# Patient Record
Sex: MALE | Race: WHITE | NOT HISPANIC OR LATINO | Employment: FULL TIME | URBAN - METROPOLITAN AREA
[De-identification: names, ages, dates, MRNs, and addresses within clinical notes are randomized per-mention and may not be internally consistent; named-entity substitution may affect disease eponyms.]

---

## 2021-04-06 ENCOUNTER — HOSPITAL ENCOUNTER (EMERGENCY)
Age: 41
Discharge: HOME | End: 2021-04-06
Attending: STUDENT IN AN ORGANIZED HEALTH CARE EDUCATION/TRAINING PROGRAM

## 2021-04-06 ENCOUNTER — APPOINTMENT (OUTPATIENT)
Dept: GENERAL RADIOLOGY | Age: 41
End: 2021-04-06
Attending: EMERGENCY MEDICINE

## 2021-04-06 VITALS
DIASTOLIC BLOOD PRESSURE: 81 MMHG | RESPIRATION RATE: 21 BRPM | OXYGEN SATURATION: 98 % | HEART RATE: 69 BPM | SYSTOLIC BLOOD PRESSURE: 134 MMHG | TEMPERATURE: 99.1 F

## 2021-04-06 DIAGNOSIS — R07.9 CHEST PAIN, UNSPECIFIED TYPE: Primary | ICD-10-CM

## 2021-04-06 LAB
ALBUMIN SERPL-MCNC: 4.3 G/DL (ref 3.6–5.1)
ALBUMIN/GLOB SERPL: 1 {RATIO} (ref 1–2.4)
ALP SERPL-CCNC: 62 UNITS/L (ref 45–117)
ALT SERPL-CCNC: 80 UNITS/L
ANION GAP SERPL CALC-SCNC: 14 MMOL/L (ref 10–20)
AST SERPL-CCNC: 48 UNITS/L
BASOPHILS # BLD: 0.1 K/MCL (ref 0–0.3)
BASOPHILS NFR BLD: 1 %
BILIRUB SERPL-MCNC: 0.5 MG/DL (ref 0.2–1)
BUN SERPL-MCNC: 14 MG/DL (ref 6–20)
BUN/CREAT SERPL: 13 (ref 7–25)
CALCIUM SERPL-MCNC: 9.4 MG/DL (ref 8.4–10.2)
CHLORIDE SERPL-SCNC: 102 MMOL/L (ref 98–107)
CO2 SERPL-SCNC: 27 MMOL/L (ref 21–32)
CREAT SERPL-MCNC: 1.07 MG/DL (ref 0.67–1.17)
DEPRECATED RDW RBC: 44.7 FL (ref 39–50)
EOSINOPHIL # BLD: 0.1 K/MCL (ref 0–0.5)
EOSINOPHIL NFR BLD: 1 %
ERYTHROCYTE [DISTWIDTH] IN BLOOD: 13.2 % (ref 11–15)
FASTING DURATION TIME PATIENT: ABNORMAL H
GFR SERPLBLD BASED ON 1.73 SQ M-ARVRAT: 86 ML/MIN/1.73M2
GLOBULIN SER-MCNC: 4.1 G/DL (ref 2–4)
GLUCOSE SERPL-MCNC: 115 MG/DL (ref 65–99)
HCT VFR BLD CALC: 41.7 % (ref 39–51)
HGB BLD-MCNC: 14 G/DL (ref 13–17)
IMM GRANULOCYTES # BLD AUTO: 0 K/MCL (ref 0–0.2)
IMM GRANULOCYTES # BLD: 0 %
LYMPHOCYTES # BLD: 1.9 K/MCL (ref 1–4.8)
LYMPHOCYTES NFR BLD: 24 %
MCH RBC QN AUTO: 30.7 PG (ref 26–34)
MCHC RBC AUTO-ENTMCNC: 33.6 G/DL (ref 32–36.5)
MCV RBC AUTO: 91.4 FL (ref 78–100)
MONOCYTES # BLD: 0.7 K/MCL (ref 0.3–0.9)
MONOCYTES NFR BLD: 9 %
NEUTROPHILS # BLD: 5.2 K/MCL (ref 1.8–7.7)
NEUTROPHILS NFR BLD: 65 %
NRBC BLD MANUAL-RTO: 0 /100 WBC
PLATELET # BLD AUTO: 234 K/MCL (ref 140–450)
POTASSIUM SERPL-SCNC: 4.2 MMOL/L (ref 3.4–5.1)
PROT SERPL-MCNC: 8.4 G/DL (ref 6.4–8.2)
RBC # BLD: 4.56 MIL/MCL (ref 4.5–5.9)
SODIUM SERPL-SCNC: 139 MMOL/L (ref 135–145)
TROPONIN I SERPL HS-MCNC: <0.02 NG/ML
WBC # BLD: 8 K/MCL (ref 4.2–11)

## 2021-04-06 PROCEDURE — 93005 ELECTROCARDIOGRAM TRACING: CPT | Performed by: EMERGENCY MEDICINE

## 2021-04-06 PROCEDURE — 10002803 HB RX 637: Performed by: STUDENT IN AN ORGANIZED HEALTH CARE EDUCATION/TRAINING PROGRAM

## 2021-04-06 PROCEDURE — 80053 COMPREHEN METABOLIC PANEL: CPT | Performed by: EMERGENCY MEDICINE

## 2021-04-06 PROCEDURE — 99285 EMERGENCY DEPT VISIT HI MDM: CPT | Performed by: STUDENT IN AN ORGANIZED HEALTH CARE EDUCATION/TRAINING PROGRAM

## 2021-04-06 PROCEDURE — 71045 X-RAY EXAM CHEST 1 VIEW: CPT

## 2021-04-06 PROCEDURE — 85025 COMPLETE CBC W/AUTO DIFF WBC: CPT | Performed by: EMERGENCY MEDICINE

## 2021-04-06 PROCEDURE — 10002800 HB RX 250 W HCPCS: Performed by: STUDENT IN AN ORGANIZED HEALTH CARE EDUCATION/TRAINING PROGRAM

## 2021-04-06 PROCEDURE — 99285 EMERGENCY DEPT VISIT HI MDM: CPT

## 2021-04-06 PROCEDURE — 84484 ASSAY OF TROPONIN QUANT: CPT | Performed by: EMERGENCY MEDICINE

## 2021-04-06 PROCEDURE — 93010 ELECTROCARDIOGRAM REPORT: CPT | Performed by: INTERNAL MEDICINE

## 2021-04-06 PROCEDURE — 10004651 HB RX, NO CHARGE ITEM: Performed by: STUDENT IN AN ORGANIZED HEALTH CARE EDUCATION/TRAINING PROGRAM

## 2021-04-06 PROCEDURE — 96374 THER/PROPH/DIAG INJ IV PUSH: CPT

## 2021-04-06 RX ORDER — CYCLOBENZAPRINE HCL 10 MG
10 TABLET ORAL 2 TIMES DAILY PRN
Qty: 15 TABLET | Refills: 0 | Status: SHIPPED | OUTPATIENT
Start: 2021-04-06

## 2021-04-06 RX ORDER — ALBUTEROL SULFATE 90 UG/1
2 AEROSOL, METERED RESPIRATORY (INHALATION) EVERY 4 HOURS PRN
COMMUNITY

## 2021-04-06 RX ORDER — ACETAMINOPHEN 325 MG/1
650 TABLET ORAL ONCE
Status: COMPLETED | OUTPATIENT
Start: 2021-04-06 | End: 2021-04-06

## 2021-04-06 RX ORDER — DIAZEPAM 5 MG/1
5 TABLET ORAL ONCE
Status: COMPLETED | OUTPATIENT
Start: 2021-04-06 | End: 2021-04-06

## 2021-04-06 RX ORDER — ACETAMINOPHEN 325 MG/1
650 TABLET ORAL EVERY 8 HOURS PRN
Qty: 40 TABLET | Refills: 0 | Status: SHIPPED | OUTPATIENT
Start: 2021-04-06

## 2021-04-06 RX ADMIN — DIAZEPAM 5 MG: 5 TABLET ORAL at 20:35

## 2021-04-06 RX ADMIN — KETOROLAC TROMETHAMINE 15 MG: 15 INJECTION, SOLUTION INTRAMUSCULAR; INTRAVENOUS at 20:35

## 2021-04-06 RX ADMIN — ACETAMINOPHEN 650 MG: 325 TABLET, FILM COATED ORAL at 20:35

## 2021-04-06 ASSESSMENT — ENCOUNTER SYMPTOMS
COUGH: 0
LIGHT-HEADEDNESS: 0
DIARRHEA: 0
CONSTIPATION: 0
WEAKNESS: 0
DIZZINESS: 0
ABDOMINAL PAIN: 0
ABDOMINAL DISTENTION: 0
CHILLS: 0
WHEEZING: 0
FEVER: 0
SHORTNESS OF BREATH: 0

## 2021-04-06 ASSESSMENT — PAIN SCALES - GENERAL: PAINLEVEL_OUTOF10: 5

## 2021-04-07 LAB
ATRIAL RATE (BPM): 84
P AXIS (DEGREES): 72
PR-INTERVAL (MSEC): 146
QRS-INTERVAL (MSEC): 108
QT-INTERVAL (MSEC): 372
QTC: 440
R AXIS (DEGREES): 40
RAINBOW EXTRA TUBES HOLD SPECIMEN: NORMAL
REPORT TEXT: NORMAL
T AXIS (DEGREES): 38
VENTRICULAR RATE EKG/MIN (BPM): 84

## 2023-11-22 ENCOUNTER — HOSPITAL ENCOUNTER (OUTPATIENT)
Age: 43
Discharge: HOME | End: 2023-11-22
Payer: COMMERCIAL

## 2023-11-22 VITALS
WEIGHT: 204 LBS | DIASTOLIC BLOOD PRESSURE: 86 MMHG | OXYGEN SATURATION: 96 % | HEART RATE: 79 BPM | TEMPERATURE: 98 F | RESPIRATION RATE: 20 BRPM | SYSTOLIC BLOOD PRESSURE: 137 MMHG | BODY MASS INDEX: 30.92 KG/M2 | HEIGHT: 68 IN

## 2023-11-22 DIAGNOSIS — J11.1 INFLUENZA: Primary | ICD-10-CM

## 2023-11-22 LAB
POCT INFLUENZA A: NEGATIVE
POCT INFLUENZA B: POSITIVE
SARS-COV-2 RNA RESP QL NAA+PROBE: NOT DETECTED

## 2023-11-22 PROCEDURE — 87502 INFLUENZA DNA AMP PROBE: CPT | Performed by: PHYSICIAN ASSISTANT

## 2023-11-22 PROCEDURE — 99203 OFFICE O/P NEW LOW 30 MIN: CPT

## 2023-11-22 RX ORDER — PREDNISONE 20 MG/1
40 TABLET ORAL DAILY
Qty: 10 TABLET | Refills: 0 | Status: SHIPPED | OUTPATIENT
Start: 2023-11-22 | End: 2023-11-27

## 2023-11-22 RX ORDER — BENZONATATE 100 MG/1
100 CAPSULE ORAL 3 TIMES DAILY PRN
Qty: 30 CAPSULE | Refills: 0 | Status: SHIPPED | OUTPATIENT
Start: 2023-11-22 | End: 2023-12-22

## 2023-11-22 NOTE — DISCHARGE INSTRUCTIONS
Influenza can potentially last 7 to 10 days. Push clear fluids. Steroid as written. Tessalon for cough suppression. Inhaler.

## 2024-04-02 ENCOUNTER — OFFICE VISIT (OUTPATIENT)
Dept: INTERNAL MEDICINE CLINIC | Facility: CLINIC | Age: 44
End: 2024-04-02
Payer: COMMERCIAL

## 2024-04-02 VITALS
HEART RATE: 81 BPM | OXYGEN SATURATION: 99 % | WEIGHT: 206 LBS | BODY MASS INDEX: 33.11 KG/M2 | SYSTOLIC BLOOD PRESSURE: 136 MMHG | HEIGHT: 66 IN | DIASTOLIC BLOOD PRESSURE: 84 MMHG

## 2024-04-02 DIAGNOSIS — G56.21 IRRITATION OF RIGHT ULNAR NERVE: ICD-10-CM

## 2024-04-02 DIAGNOSIS — Z00.00 PREVENTATIVE HEALTH CARE: Primary | ICD-10-CM

## 2024-04-02 DIAGNOSIS — D22.9 NEVUS: ICD-10-CM

## 2024-04-02 DIAGNOSIS — K21.9 GASTROESOPHAGEAL REFLUX DISEASE, UNSPECIFIED WHETHER ESOPHAGITIS PRESENT: ICD-10-CM

## 2024-04-02 RX ORDER — CETIRIZINE HYDROCHLORIDE 10 MG/1
10 TABLET ORAL DAILY
COMMUNITY

## 2024-04-02 RX ORDER — OMEPRAZOLE 40 MG/1
40 CAPSULE, DELAYED RELEASE ORAL DAILY
Qty: 90 CAPSULE | Refills: 1 | Status: SHIPPED | OUTPATIENT
Start: 2024-04-02 | End: 2025-03-28

## 2024-04-02 RX ORDER — OMEPRAZOLE 20 MG/1
20 CAPSULE, DELAYED RELEASE ORAL
COMMUNITY
End: 2024-04-02

## 2024-04-02 NOTE — PROGRESS NOTES
Alberto Bonilla  44 year old     .    Is here for preventative wellness exam and  complaints :        Ulnar nerve  symptoms ,  GERD  sx       ROS    GENERAL:no systemic symptoms,- feels healthy  screening for depression negative, does not drink excessively, no drug use,quit cigs  3  yrs ago  social     Discussed weight/BMI=  works out  bike  wts  - eats  healthy  runs     LUNGS:denies shortness of breath or resp symptoms    CARDIOVASCULAR: denies chest pain - blimp      GI: Does not endorse any GI SYMPTOMS  other than  GERD     ALL OTHER REVIEW IS NEGATIVE       There is no problem list on file for this patient.  History reviewed. No pertinent surgical history.   Family History   Problem Relation Age of Onset    No Known Problems Father     No Known Problems Mother     Pulmonary Disease Maternal Grandmother     Diabetes Maternal Grandfather      Social History     Socioeconomic History    Marital status: Unknown   Tobacco Use    Smoking status: Former     Types: Cigarettes     Quit date: 2021     Years since quitting: 3.2    Smokeless tobacco: Never   Vaping Use    Vaping Use: Never used   Substance and Sexual Activity    Alcohol use: Yes     Comment: 7-10 weekly    Drug use: Never   Social History Narrative    ** Merged History Encounter **           Current Outpatient Medications:     cetirizine 10 MG Oral Tab, Take 1 tablet (10 mg total) by mouth daily., Disp: , Rfl:     Omeprazole 40 MG Oral Capsule Delayed Release, Take 1 capsule (40 mg total) by mouth daily., Disp: 90 capsule, Rfl: 1             EXAM:     Vitals:    04/02/24 1300   BP: 136/84   Pulse:    VITALSBody mass index is 33.25 kg/m².      Wt Readings from Last 3 Encounters:   04/02/24 206 lb (93.4 kg)   11/22/23 204 lb (92.5 kg)           BP Readings from Last 3 Encounters:   04/02/24 136/84   11/22/23 137/86           Appears Healthy    Neck: normal    Lungs: clear , nl     Heart:  Reg no m     Abdomen:  Nondistended no HSM       Back  lipoma       Extremities:  nl     Skin:  right lower  back lesion    Neurological: nl       Diagnoses and all orders for this visit:    Preventative health care  -     CBC With Differential With Platelet  -     Comp Metabolic Panel (14)  -     Lipid Panel  -     EKG 12 Lead; Future    Nevus  -     Derm Referral - [facility] ([facility])    Gastroesophageal reflux disease, unspecified whether esophagitis present    Irritation of right ulnar nerve  -     PLASTIC SURGERY - INTERNAL    Other orders  -     Omeprazole 40 MG Oral Capsule Delayed Release; Take 1 capsule (40 mg total) by mouth daily.          Discussed importance of healthy lifestyle with exercise and diet.  Recommend to avoid tobacco  Keep alcohol use to a minimum  Discussed appropriate screening tests;   Discussed vaccines    Refer to derm  -  discussed PPI and   side effects     Discussed  rechecking  bp in near future     Check labs   and EKG     Will refer to hand surgery for ulnar nerve sxs       This note was prepared using Dragon Medical voice recognition dictation software and as a result, errors may occur. When identified, these errors have been corrected. While every attempt is made to correct errors during dictation, discrepancies may still exist       Angelito Raya MD

## 2024-04-03 ENCOUNTER — TELEPHONE (OUTPATIENT)
Dept: INTERNAL MEDICINE CLINIC | Facility: CLINIC | Age: 44
End: 2024-04-03

## 2024-04-03 NOTE — TELEPHONE ENCOUNTER
----- Message from Angelito Raya MD sent at 4/2/2024  8:43 PM CDT -----  Let know I put referral in to see Dr Guerra  hand  expert     normal

## 2024-04-22 ENCOUNTER — TELEPHONE (OUTPATIENT)
Dept: INTERNAL MEDICINE CLINIC | Facility: CLINIC | Age: 44
End: 2024-04-22

## 2024-04-22 DIAGNOSIS — R20.0 NUMBNESS OF FINGERS: ICD-10-CM

## 2024-04-22 DIAGNOSIS — R20.0 NUMBNESS AND TINGLING IN RIGHT HAND: Primary | ICD-10-CM

## 2024-04-22 DIAGNOSIS — R20.2 NUMBNESS AND TINGLING IN RIGHT HAND: Primary | ICD-10-CM

## 2024-04-22 NOTE — TELEPHONE ENCOUNTER
Pt called stating that he tried to sched an appt with plastic surgeon and he was told that he needs to get a EMG test done, then he will determine if what comes out of that test is something he can treat    Please advise

## 2024-04-23 NOTE — TELEPHONE ENCOUNTER
Per Dr Raya's OV notes, referred to hand surgeon for ulnar nerve symptoms but symptoms not detailed. Call placed to patient to obtain symptom details. Per pt, right hand, 4th-5th digits often numb and then begin to tingle such that whole hand then becomes somewhat numb and tingly intermittently and sensation extends to rt elbow.  Condition noted by pt since Fall of 2023, ~ 6 months.    EMG order placed and pended until the type of EMG required can be determined.  Message routed via EPIC to Dr Gallagher/ hand surgeon to confirm type of EMG required.     Await response from hand surgeon.  ________________  Appears hand surgeon's office changed their protocol-- called pt and scheduled appt w/ surgeon for Monday w/o an EMG being required.  Pt informed. Pended EMG order cancelled.

## 2024-04-25 ENCOUNTER — PATIENT MESSAGE (OUTPATIENT)
Dept: INTERNAL MEDICINE CLINIC | Facility: CLINIC | Age: 44
End: 2024-04-25

## 2024-04-25 DIAGNOSIS — Z11.3 SCREEN FOR STD (SEXUALLY TRANSMITTED DISEASE): Primary | ICD-10-CM

## 2024-04-29 ENCOUNTER — OFFICE VISIT (OUTPATIENT)
Dept: SURGERY | Facility: CLINIC | Age: 44
End: 2024-04-29
Payer: COMMERCIAL

## 2024-04-29 DIAGNOSIS — R20.0 NUMBNESS OF RIGHT HAND: Primary | ICD-10-CM

## 2024-04-29 PROCEDURE — 99204 OFFICE O/P NEW MOD 45 MIN: CPT | Performed by: PLASTIC SURGERY

## 2024-04-29 RX ORDER — INDOMETHACIN 25 MG/1
25 CAPSULE ORAL
Qty: 42 CAPSULE | Refills: 0 | Status: SHIPPED | OUTPATIENT
Start: 2024-04-29 | End: 2024-05-13

## 2024-04-29 RX ORDER — ALBUTEROL SULFATE 90 UG/1
2 AEROSOL, METERED RESPIRATORY (INHALATION) EVERY 4 HOURS PRN
COMMUNITY

## 2024-04-29 NOTE — H&P
Alberto Bonilla is a 44 year old male that presents with   Chief Complaint   Patient presents with    Numbness     R hand numbness   .    REFERRED BY:  Angelito Raya     Pacemaker: No  Latex Allergy: no  Coumadin: No  Plavix: No  Other anticoagulants: No  Diet medication: No  Cardiac stents: No    HAND DOMINANCE:  Right    Profession: Digital communications    RECONSTRUCTIVE HISTORY    SUN EXPOSURE   Current no   Past no   Sunburns no   Tanning salons current no   Tanning salons past no     SKIN CANCER    Personal history of skin cancer: none      HPI:       44-year-old male right-hand-dominant right hand numbness    6 months duration and worsening    Intermittent middle ring and small as well as proximal ulnar forearm    Left hand is okay    No pain but sometimes has burning with the numbness    Right hand feels weak and he often has to stop what he is doing when the numbness occurs    No history of trauma          Review of Systems:   Constitutional: No change in appetite, chill/rigors, or fatigue  GI: No jaundice  Endocrine: No generalized weakness  Neurological: No aphasia, loss of consciousness, or seizures    Musculoskeletal:     Duration    6   months    NUMBNESS / TINGLING      RIGHT  Intermittent  middle finger  ring finger  small finger  Ulnar side of palm  Radiates up to elbow      Which hand is worse?  right     Pain:    No just discomfort from N/T    Night symptoms:  No, but if lays on R side, his R hand wakes up numb    Functional problems: Yes R hand feels weak at times, worsens w/ activity, gripping items    Previous therapy:  No         PMH:     MEDICAL  History reviewed. No pertinent past medical history.     SURGICAL  History reviewed. No pertinent surgical history.     ALLERIGIES  No Known Allergies     MEDICATIONS  Current Outpatient Medications   Medication Sig Dispense Refill    cetirizine 10 MG Oral Tab Take 1 tablet (10 mg total) by mouth daily.      Omeprazole 40 MG Oral Capsule Delayed  Release Take 1 capsule (40 mg total) by mouth daily. 90 capsule 1    albuterol 108 (90 Base) MCG/ACT Inhalation Aero Soln Inhale 2 puffs into the lungs every 4 (four) hours as needed. (Patient not taking: Reported on 2024)          SOCIAL HISTORY  Social History     Socioeconomic History    Marital status:    Tobacco Use    Smoking status: Former     Current packs/day: 0.00     Types: Cigarettes     Quit date:      Years since quitting: 3.3    Smokeless tobacco: Never   Vaping Use    Vaping status: Never Used   Substance and Sexual Activity    Alcohol use: Yes     Comment: 7-10 weekly    Drug use: Never   Social History Narrative    ** Merged History Encounter **          Social Determinants of Health     Financial Resource Strain: Not on File (9/10/2022)    Received from [employer]     Financial Resource Strain     Financial Resource Strain: 0   Food Insecurity: Not on File (9/10/2022)    Received from [employer]     Food Insecurity     Food: 0   Transportation Needs: Not on File (9/10/2022)    Received from [employer]     Transportation Needs     Transportation: 0   Physical Activity: Not on File (9/10/2022)    Received from [employer]     Physical Activity     Physical Activity: 0   Stress: Not on File (9/10/2022)    Received from [employer]     Stress     Stress: 0   Social Connections: Not on File (9/10/2022)    Received from [employer]     Social Connections     Social Connections and Isolation: 0   Housing Stability: Not on File (9/10/2022)    Received from [employer]     Housing Stability     Housin        FAMILY HISTORY  Family History   Problem Relation Age of Onset    No Known Problems Father     No Known Problems Mother     Pulmonary Disease Maternal Grandmother     Diabetes Maternal Grandfather           PHYSICAL EXAM:     CONSTITUTIONAL: Overall appearance - Normal  HEENT: Normocephalic  EYES: Conjunctiva - Right: Normal, Left: Normal; EOMI  EARS: Inspection - Right: Normal, Left: Normal  NECK/THYROID: Inspection -  Normal, Palpation - Normal, Thyroid gland - Normal, No adenopathy  RESPIRATORY: Inspection - Normal, Effort - Normal  CARDIOVASCULAR: Regular rhythm, No murmurs  ABDOMEN: Inspection - Normal, No abdominal tenderness  NEURO: Memory intact  PSYCH: Oriented to person, place, time, and situation, Appropriate mood and affect        Hand Physical Exam:    ROM: bilateral full painless  Wrist Hyperextension: bilateral excellent  Thenar Intrinsics: bilateral intact/symmetric  Ulnar Intrinsics: bilateral intact/symmetric  Wrist Tenderness: bilateral none  Sensation: bilateral grossly intact    Median Nerve Compression: bilateral negative    Tinel Median at Wrist: bilateral negative     Tinel ulnar elbow and Tinel ulnar wrist: Right negative  Elbow flexion test negative      ASSESSMENT/PLAN:       Right ulnar hand numbness      Possible right ulnar nerve at the elbow, but findings soft  Could have a C8/T1 neck lesion      Elbow pad  Indocin 25 3 times daily for 2 weeks    If unimproved, see physiatry            4/29/2024  Munir Abdul MD      +++++++++++++++++++++++++++++++++++++++++      MEDICAL DECISION MAKING    PROBLEMS      MODERATE    (number / complexity)          Undiagnosed new illness with uncertain prognosis    DATA         STRAIGHTFORWARD    (amount / complexity)           MANAGEMENT RISK  MODERATE    (complications/ morbidity)       Indocin                  MDM LEVEL    MODERATE

## 2024-05-17 ENCOUNTER — LAB ENCOUNTER (OUTPATIENT)
Dept: LAB | Age: 44
End: 2024-05-17
Attending: INTERNAL MEDICINE

## 2024-05-17 DIAGNOSIS — Z11.3 SCREENING EXAMINATION FOR VENEREAL DISEASE: Primary | ICD-10-CM

## 2024-05-17 LAB
ALBUMIN SERPL-MCNC: 4.2 G/DL (ref 3.4–5)
ALBUMIN/GLOB SERPL: 1.1 {RATIO} (ref 1–2)
ALP LIVER SERPL-CCNC: 62 U/L
ALT SERPL-CCNC: 42 U/L
ANION GAP SERPL CALC-SCNC: 6 MMOL/L (ref 0–18)
AST SERPL-CCNC: 52 U/L (ref 15–37)
BASOPHILS # BLD AUTO: 0.04 X10(3) UL (ref 0–0.2)
BASOPHILS NFR BLD AUTO: 0.6 %
BILIRUB SERPL-MCNC: 0.6 MG/DL (ref 0.1–2)
BUN BLD-MCNC: 15 MG/DL (ref 9–23)
CALCIUM BLD-MCNC: 9.5 MG/DL (ref 8.5–10.1)
CHLORIDE SERPL-SCNC: 103 MMOL/L (ref 98–112)
CHOLEST SERPL-MCNC: 197 MG/DL (ref ?–200)
CO2 SERPL-SCNC: 27 MMOL/L (ref 21–32)
CREAT BLD-MCNC: 1.09 MG/DL
EGFRCR SERPLBLD CKD-EPI 2021: 86 ML/MIN/1.73M2 (ref 60–?)
EOSINOPHIL # BLD AUTO: 0.13 X10(3) UL (ref 0–0.7)
EOSINOPHIL NFR BLD AUTO: 1.9 %
ERYTHROCYTE [DISTWIDTH] IN BLOOD BY AUTOMATED COUNT: 13.1 %
FASTING PATIENT LIPID ANSWER: YES
FASTING STATUS PATIENT QL REPORTED: YES
GLOBULIN PLAS-MCNC: 3.7 G/DL (ref 2.8–4.4)
GLUCOSE BLD-MCNC: 117 MG/DL (ref 70–99)
HAV AB SER QL IA: REACTIVE
HAV IGM SER QL: NONREACTIVE
HBV CORE AB SERPL QL IA: NONREACTIVE
HBV SURFACE AB SER QL: REACTIVE
HBV SURFACE AB SERPL IA-ACNC: 622.49 MIU/ML
HBV SURFACE AG SERPL QL IA: NONREACTIVE
HCT VFR BLD AUTO: 40.6 %
HCV AB SERPL QL IA: NONREACTIVE
HDLC SERPL-MCNC: 46 MG/DL (ref 40–59)
HGB BLD-MCNC: 13.9 G/DL
IMM GRANULOCYTES # BLD AUTO: 0.02 X10(3) UL (ref 0–1)
IMM GRANULOCYTES NFR BLD: 0.3 %
LDLC SERPL CALC-MCNC: 137 MG/DL (ref ?–100)
LYMPHOCYTES # BLD AUTO: 2.26 X10(3) UL (ref 1–4)
LYMPHOCYTES NFR BLD AUTO: 33 %
MCH RBC QN AUTO: 31.1 PG (ref 26–34)
MCHC RBC AUTO-ENTMCNC: 34.2 G/DL (ref 31–37)
MCV RBC AUTO: 90.8 FL
MONOCYTES # BLD AUTO: 0.57 X10(3) UL (ref 0.1–1)
MONOCYTES NFR BLD AUTO: 8.3 %
NEUTROPHILS # BLD AUTO: 3.82 X10 (3) UL (ref 1.5–7.7)
NEUTROPHILS # BLD AUTO: 3.82 X10(3) UL (ref 1.5–7.7)
NEUTROPHILS NFR BLD AUTO: 55.9 %
NONHDLC SERPL-MCNC: 151 MG/DL (ref ?–130)
OSMOLALITY SERPL CALC.SUM OF ELEC: 284 MOSM/KG (ref 275–295)
PLATELET # BLD AUTO: 257 10(3)UL (ref 150–450)
POTASSIUM SERPL-SCNC: 4.4 MMOL/L (ref 3.5–5.1)
PROT SERPL-MCNC: 7.9 G/DL (ref 6.4–8.2)
RBC # BLD AUTO: 4.47 X10(6)UL
SODIUM SERPL-SCNC: 136 MMOL/L (ref 136–145)
T PALLIDUM AB SER QL IA: NONREACTIVE
TRIGL SERPL-MCNC: 74 MG/DL (ref 30–149)
VLDLC SERPL CALC-MCNC: 14 MG/DL (ref 0–30)
WBC # BLD AUTO: 6.8 X10(3) UL (ref 4–11)

## 2024-05-17 PROCEDURE — 87389 HIV-1 AG W/HIV-1&-2 AB AG IA: CPT | Performed by: INTERNAL MEDICINE

## 2024-05-17 PROCEDURE — 86709 HEPATITIS A IGM ANTIBODY: CPT | Performed by: INTERNAL MEDICINE

## 2024-05-17 PROCEDURE — 87340 HEPATITIS B SURFACE AG IA: CPT | Performed by: INTERNAL MEDICINE

## 2024-05-17 PROCEDURE — 87491 CHLMYD TRACH DNA AMP PROBE: CPT

## 2024-05-17 PROCEDURE — 87591 N.GONORRHOEAE DNA AMP PROB: CPT

## 2024-05-17 PROCEDURE — 85025 COMPLETE CBC W/AUTO DIFF WBC: CPT | Performed by: INTERNAL MEDICINE

## 2024-05-17 PROCEDURE — 86803 HEPATITIS C AB TEST: CPT | Performed by: INTERNAL MEDICINE

## 2024-05-17 PROCEDURE — 86704 HEP B CORE ANTIBODY TOTAL: CPT | Performed by: INTERNAL MEDICINE

## 2024-05-17 PROCEDURE — 86706 HEP B SURFACE ANTIBODY: CPT | Performed by: INTERNAL MEDICINE

## 2024-05-17 PROCEDURE — 86708 HEPATITIS A ANTIBODY: CPT | Performed by: INTERNAL MEDICINE

## 2024-05-17 PROCEDURE — 80503 PATH CLIN CONSLTJ SF 5-20: CPT | Performed by: INTERNAL MEDICINE

## 2024-05-17 PROCEDURE — 80061 LIPID PANEL: CPT | Performed by: INTERNAL MEDICINE

## 2024-05-17 PROCEDURE — 86780 TREPONEMA PALLIDUM: CPT | Performed by: INTERNAL MEDICINE

## 2024-05-17 PROCEDURE — 80053 COMPREHEN METABOLIC PANEL: CPT | Performed by: INTERNAL MEDICINE

## 2024-05-20 LAB
C TRACH DNA SPEC QL NAA+PROBE: NEGATIVE
N GONORRHOEA DNA SPEC QL NAA+PROBE: NEGATIVE

## 2024-07-11 ENCOUNTER — OFFICE VISIT (OUTPATIENT)
Dept: INTERNAL MEDICINE CLINIC | Facility: CLINIC | Age: 44
End: 2024-07-11
Payer: COMMERCIAL

## 2024-07-11 ENCOUNTER — PATIENT MESSAGE (OUTPATIENT)
Dept: INTERNAL MEDICINE CLINIC | Facility: CLINIC | Age: 44
End: 2024-07-11

## 2024-07-11 VITALS
HEIGHT: 66 IN | SYSTOLIC BLOOD PRESSURE: 120 MMHG | OXYGEN SATURATION: 97 % | HEART RATE: 63 BPM | DIASTOLIC BLOOD PRESSURE: 78 MMHG | BODY MASS INDEX: 31.5 KG/M2 | WEIGHT: 196 LBS

## 2024-07-11 DIAGNOSIS — R86.8 LOW SPERM MOTILITY: ICD-10-CM

## 2024-07-11 DIAGNOSIS — R68.89 DECREASED FERTILITY IN MALE: ICD-10-CM

## 2024-07-11 DIAGNOSIS — R20.2 NUMBNESS AND TINGLING IN RIGHT HAND: ICD-10-CM

## 2024-07-11 DIAGNOSIS — R73.9 HYPERGLYCEMIA: Primary | ICD-10-CM

## 2024-07-11 DIAGNOSIS — R20.0 NUMBNESS AND TINGLING IN RIGHT HAND: ICD-10-CM

## 2024-07-11 LAB — HEMOGLOBIN A1C: 5.8 % (ref 4.3–5.6)

## 2024-07-11 PROCEDURE — 99214 OFFICE O/P EST MOD 30 MIN: CPT | Performed by: INTERNAL MEDICINE

## 2024-07-11 PROCEDURE — 83036 HEMOGLOBIN GLYCOSYLATED A1C: CPT | Performed by: INTERNAL MEDICINE

## 2024-07-11 NOTE — TELEPHONE ENCOUNTER
Patient's attached wellness form will  not print veritical it is only printing landscape. [employer] message sent requesting the patient drop off or mail the form.

## 2024-07-11 NOTE — PROGRESS NOTES
Alberto Bonilla male 44 year old         Chief Complaint   Patient presents with    Test Results    Referral     Urology-recommended by IVF doctor     Since last  visit  saw  plastics for ulnar-area numbness and tingling better  with indocin     Rozina  no change  to see derm soon       Discussed labs  fasting  sugar   117  --  trigs and  chol  good      Had low  sperm count  now  on vitamins        On ppi helps        There is no problem list on file for this patient.         Current Outpatient Medications on File Prior to Visit   Medication Sig Dispense Refill    cetirizine 10 MG Oral Tab Take 1 tablet (10 mg total) by mouth daily.      Omeprazole 40 MG Oral Capsule Delayed Release Take 1 capsule (40 mg total) by mouth daily. 90 capsule 1    albuterol 108 (90 Base) MCG/ACT Inhalation Aero Soln Inhale 2 puffs into the lungs every 4 (four) hours as needed. (Patient not taking: Reported on 4/29/2024)       No current facility-administered medications on file prior to visit.          Vitals:    07/11/24 1215   BP: 120/78   Pulse: 63   VITALSBody mass index is 31.64 kg/m².    Pertinent findings on the physical exam; rozina  looks stable          Alberto was seen today for test results and referral.    Diagnoses and all orders for this visit:    Hyperglycemia  -     POC Glycohemoglobin [68451]    Decreased fertility in male  -     Urology Referral - In Network    Low sperm motility  -     Urology Referral - In Network             Had long talk about  about  prediabetes   Will see back in approximately 6 months check A1c then    Has  been losing  wt   on Mediterranean diet      Refer to urology for low sperm count      See derm soon     Will have see me if any symptoms come back in his right hand.      This note was prepared using Dragon Medical voice recognition dictation software and as a result, errors may occur. When identified, these errors have been corrected. While every attempt is made to correct errors during  dictation, discrepancies may still exist

## 2024-07-11 NOTE — TELEPHONE ENCOUNTER
From: Alberto Bonilla  To: Angelito Raya  Sent: 7/11/2024 12:15 PM CDT  Subject: Form Completion for Dr. Dorota Martinez,    I need to have the attached form signed stating that I’ve had an annual physical.     Thank you,  Jorge

## 2024-07-16 ENCOUNTER — OFFICE VISIT (OUTPATIENT)
Dept: SURGERY | Facility: CLINIC | Age: 44
End: 2024-07-16
Payer: COMMERCIAL

## 2024-07-16 DIAGNOSIS — N46.9 INFERTILITY MALE: Primary | ICD-10-CM

## 2024-07-16 PROCEDURE — 99203 OFFICE O/P NEW LOW 30 MIN: CPT | Performed by: PHYSICIAN ASSISTANT

## 2024-07-16 NOTE — PROGRESS NOTES
[facility], [facility]    Urology Consult Note    History of Present Illness:   Patient is a 44 year old male with no significant medical history who presents today for consultation from Dr. Raya's office for infertility.     He and his wife have been trying to conceive for 12 months with unprotected intercourse. Currently under evaluation with fertility clinic. Wife had low follicle count and uterine polyp that was removed. She will be starting treatment soon for this also.   He reports no prior children. His partner has never been pregnant.    His appetite and activity are good. He denies unexplained weight loss, weight gain. His energy and libido are good. He reports good erections.    He denies  trauma or surgeries. He denies chronic illnesses. He denies excessive use of alcohol, tobacco, marijuana, opioids. He denies prior radiation therapy or chemotherapy, anabolic steroids, corticosteroids, pesticide exposure.  Has on short spurts of prednisone for upper respiratory infection, but nothing long term.     April 2024 semen analysis results:  Volume 0.5mL  Count 38 million/mL  Total count 19million  Motility 44.74%  Morphology 1.5%    No significant bothersome urinary complaints.     HISTORY:  No past medical history on file.   No past surgical history on file.   Family History   Problem Relation Age of Onset    No Known Problems Father     No Known Problems Mother     Pulmonary Disease Maternal Grandmother     Diabetes Maternal Grandfather       Social History:   Social History     Socioeconomic History    Marital status:    Tobacco Use    Smoking status: Former     Current packs/day: 0.00     Types: Cigarettes     Quit date: 2021     Years since quitting: 3.5    Smokeless tobacco: Never   Vaping Use    Vaping status: Never Used   Substance and Sexual Activity    Alcohol use: Yes     Comment: 7-10 weekly    Drug use: Never   Social History Narrative    ** Merged  History Encounter **          Social Determinants of Health     Financial Resource Strain: Not on File (9/10/2022)    Received from [facility] [employer] [facility]    Financial Resource Strain     Financial Resource Strain: 0   Food Insecurity: Not on File (9/10/2022)    Received from [employer]    Food Insecurity     Food: 0   Transportation Needs: Not on File (9/10/2022)    Received from [facility]    Transportation Needs     Transportation: 0   Physical Activity: Not on File (9/10/2022)    Received from [employer]    Physical Activity     Physical Activity: 0   Stress: Not on File (9/10/2022)    Received from [facility]    Stress     Stress: 0   Social Connections: Not on File (9/10/2022)    Received from [facility] [employer]    Social Connections     Social Connections and Isolation: 0   Housing Stability: Not on File (9/10/2022)    Received from [facility]    Housing Stability     Housin        Allergies  No Known Allergies    Review of Systems:   A 10-point review of systems was completed and is negative other than as noted above.    Physical Exam:   There were no vitals taken for this visit.    GENERAL APPEARANCE: well developed, well nourished, in no acute distress  NEUROLOGIC: no localizing neurologic signs, alert and oriented x 3, converses appropriately  HEAD: atraumatic, normocephalic  EYES: sclera non-icteric  ORAL CAVITY: mucosa moist  NECK/THYROID: no obvious masses or goiter  LUNGS: non-labored breathing  ABDOMEN: soft, nontender, nondistended  No CVAT  INGUINAL CANALS: no hernias  PENILE MEATUS: open and in normal location  PENIS normal  SCROTUM: normal  no varicocele  TESTES: normal anatomy  EPIDIDYMIS: normal anatomy vas deferens palpable bilaterally  SPENCER deferred  EXTREMITIES: warm, well-perfused. No clubbing, cyanosis or edema.  SKIN: no obvious rashes    Results:     Laboratory Data:  Lab Results   Component Value Date    WBC 6.8 2024    HGB 13.9 2024    .0 2024     Lab Results    Component Value Date     05/17/2024    K 4.4 05/17/2024     05/17/2024    CO2 27.0 05/17/2024    BUN 15 05/17/2024     (H) 05/17/2024    AST 52 (H) 05/17/2024    ALT 42 05/17/2024    TP 7.9 05/17/2024    ALB 4.2 05/17/2024    CA 9.5 05/17/2024       Urinalysis Results (last three years):  No results for input(s)  in the last 3 years    Urine Culture Results (last three years):  No results found for: \"URINECUL\"    Imaging  No results found.      Impression:     Patient is a 44 year old male  with no significant medical history who presents today for consultation from Dr. Raya's office for infertility.     We discussed that the first step in evaluation for infertility would be a semen analysis. If any abnormalities are noted we may need him to submit a second at least 1 week following the first because of the marked inherent variability of sperm concentrations in semen samples.    Should semen analysis be significantly abnormal, we discussed that he would likely require a serum evaluation including total testosterone, LH, and FSH. We may also consider TSH/T4, prolactin, CBC, CMP and PTH to ensure no other metabolic abnormalities. Scrotal US may be indicated as well.    We also discussed that up to 50% of young, healthy couples who fail to conceive in the first 12 months will conceive in the subsequent 12 months with no specific treatment     Recommendations:  Proceed with repeat semen analysis. Pending results will give further recommendations.     Thank you very much for this consult. Please call if there are any questions or concerns.     Alma Gardner PA-C  Urology  [facility]    Date: 7/16/2024

## 2024-07-23 ENCOUNTER — PATIENT MESSAGE (OUTPATIENT)
Dept: INTERNAL MEDICINE CLINIC | Facility: CLINIC | Age: 44
End: 2024-07-23

## 2024-07-23 DIAGNOSIS — N46.9 MALE INFERTILITY: ICD-10-CM

## 2024-07-23 DIAGNOSIS — Z31.69 INFERTILITY COUNSELING: Primary | ICD-10-CM

## 2024-07-23 NOTE — TELEPHONE ENCOUNTER
From: Alberto Bonilla  To: Angelito Raya  Sent: 7/23/2024 4:09 PM CDT  Subject: Referral Needed    Hello - I visited Dr. Raya on July 11 and he provided a referral to a urologist for treatment related to IVF procedures that my wife and I are undergoing. The urologist ordered lab work for a semen analysis at Dr. Angelito Sandoval's office. I contacted Dr. Sandoval's office to schedule an appointment but was told that I would first need a referral since I have an HMO for my insurance. Can Dr. Raya please provide a referral to Dr. Sandoval's office for a semen analysis?    Thank you,  Jorge  [phone]

## 2024-07-24 ENCOUNTER — OFFICE VISIT (OUTPATIENT)
Dept: DERMATOLOGY CLINIC | Facility: CLINIC | Age: 44
End: 2024-07-24
Payer: COMMERCIAL

## 2024-07-24 DIAGNOSIS — D22.9 MULTIPLE BENIGN NEVI: Primary | ICD-10-CM

## 2024-07-24 PROCEDURE — 99203 OFFICE O/P NEW LOW 30 MIN: CPT | Performed by: STUDENT IN AN ORGANIZED HEALTH CARE EDUCATION/TRAINING PROGRAM

## 2024-07-24 NOTE — PROGRESS NOTES
New Patient    Referred by: Angelito Raya MD     CHIEF COMPLAINT: Lesion of concern     HISTORY OF PRESENT ILLNESS: Alberto Bonilla is a 44 year old male here for evaluation of lesion of concern.    1. Growth   Location: Lower Back   Duration: Unknown   Signs and symptoms: Irregular shape per PCP- pt denies itching, bleeding, or pain   Current treatment: None   Past treatments: None     Personal Dermatologic History  History of skin cancer: No  History of  atypical moles: No    FAMILY HISTORY:  History of melanoma: No    Past Medical History  No past medical history on file.    REVIEW OF SYSTEMS:  Constitutional: Denies fever, chills, unintentional weight loss.   Skin as per HPI    Medications  Current Outpatient Medications   Medication Sig Dispense Refill    NON FORMULARY Vitamin D  Vitamin C  Vitamin E  Co-Q-!0  Zinc  Individual supplements takes once daily      albuterol 108 (90 Base) MCG/ACT Inhalation Aero Soln Inhale 2 puffs into the lungs every 4 (four) hours as needed.      cetirizine 10 MG Oral Tab Take 1 tablet (10 mg total) by mouth daily.      Omeprazole 40 MG Oral Capsule Delayed Release Take 1 capsule (40 mg total) by mouth daily. 90 capsule 1       PHYSICAL EXAM:  General: awake, alert, no acute distress  Neuropsych: appropriate mood and affect  Eyes: Sclerae anicteric, without conjunctival injection, eyelids unremarkable  Skin: Skin exam was performed today including the following: R lower back and arms. Pertinent findings include:   - with regular appearing brown macules     ASSESSMENT & PLAN:  Pathophysiology of diagnoses discussed with patient.  Therapeutic options reviewed. Risks, benefits, and alternatives discussed with patient. Instructions reviewed at length.    #Multiple benign nevi  - Reassured patient of benign nature of these lesions.   - Return for lesions that are new, growing, changing or symptomatic.   - Recommend daily photoprotection with broad-spectrum sunscreen (SPF 30 daily with  reapplication every 2 hours), avoidance of sun during peak hours, and sun protective clothing.   - Dermoscopy was used for physical examination of pigmented lesions during today's office visit.    Return to clinic: 1-2 years or sooner if something concerning arises     Darron Gillette MD

## 2024-07-24 NOTE — TELEPHONE ENCOUNTER
MyChart message received from pt stating OB/ Infertility specialist office/ Dr BURKE Sandoval  would not accept referral that was placed- they want the  \"services' field completed differently.      Except that there is no \"services\" field to populate on the Epic generated referral template. Generated a new referral to Dr Sandoval and stated specifically referral for for evalk and Tx of male infertility, incl counseling in the Comments field (only field we have in Epic referrals for free text verbiage).    Copy of new referral sent to pt within [facility]

## 2024-08-02 ENCOUNTER — LAB ENCOUNTER (OUTPATIENT)
Dept: LAB | Facility: HOSPITAL | Age: 44
End: 2024-08-02
Attending: PHYSICIAN ASSISTANT
Payer: COMMERCIAL

## 2024-08-09 ENCOUNTER — TELEPHONE (OUTPATIENT)
Dept: SURGERY | Facility: CLINIC | Age: 44
End: 2024-08-09

## 2024-08-16 ENCOUNTER — PATIENT MESSAGE (OUTPATIENT)
Dept: SURGERY | Facility: CLINIC | Age: 44
End: 2024-08-16

## 2024-08-20 NOTE — TELEPHONE ENCOUNTER
From: Alberto Bonilla  To: Alma Gardner  Sent: 8/16/2024 3:11 PM CDT  Subject: Results Upload    Hello,    I would like to follow up on the posting of the results of my semen analysis. My wife and I are moving forward with IVF next week and I need to know the exact numbers of my results.     Thank you,  Jorge

## 2024-08-23 NOTE — TELEPHONE ENCOUNTER
RN faxed the request of copy to Dr Sandoval and received the copy  FSA result received.  Updated patient.   Mailed FSA result copy to patient.

## 2024-11-18 RX ORDER — OMEPRAZOLE 40 MG/1
40 CAPSULE, DELAYED RELEASE ORAL DAILY
Qty: 90 CAPSULE | Refills: 0 | Status: SHIPPED | OUTPATIENT
Start: 2024-11-18

## 2025-01-14 ENCOUNTER — OFFICE VISIT (OUTPATIENT)
Dept: INTERNAL MEDICINE CLINIC | Facility: CLINIC | Age: 45
End: 2025-01-14
Payer: COMMERCIAL

## 2025-01-14 VITALS
SYSTOLIC BLOOD PRESSURE: 130 MMHG | OXYGEN SATURATION: 97 % | HEIGHT: 66 IN | DIASTOLIC BLOOD PRESSURE: 80 MMHG | BODY MASS INDEX: 33.05 KG/M2 | WEIGHT: 205.63 LBS | TEMPERATURE: 98 F | HEART RATE: 65 BPM

## 2025-01-14 DIAGNOSIS — D17.1 LIPOMA OF TORSO: ICD-10-CM

## 2025-01-14 DIAGNOSIS — R73.9 HYPERGLYCEMIA: Primary | ICD-10-CM

## 2025-01-14 DIAGNOSIS — J45.20 MILD INTERMITTENT EXTRINSIC ASTHMA WITHOUT COMPLICATION (HCC): ICD-10-CM

## 2025-01-14 DIAGNOSIS — F41.9 ANXIETY: ICD-10-CM

## 2025-01-14 DIAGNOSIS — K21.9 GASTROESOPHAGEAL REFLUX DISEASE, UNSPECIFIED WHETHER ESOPHAGITIS PRESENT: ICD-10-CM

## 2025-01-14 LAB — HEMOGLOBIN A1C: 5.8 % (ref 4.3–5.6)

## 2025-01-14 PROCEDURE — 83036 HEMOGLOBIN GLYCOSYLATED A1C: CPT | Performed by: INTERNAL MEDICINE

## 2025-01-14 PROCEDURE — 99214 OFFICE O/P EST MOD 30 MIN: CPT | Performed by: INTERNAL MEDICINE

## 2025-01-14 NOTE — PROGRESS NOTES
Alberto Bonilla male 44 year old       Chief complaint:  anxiety     Now on sertraline  -  for  anxiety     Denies having anxiety as a teen.  Got it online  -    denies  depression      Has helped  - no side  effects -      Has some GERD.-Omeprazole    Has mild asthma uses as needed albuterol    Discussed screening  test              Has no  cardiac symptoms                 Had  A1c in past  5.8        There is no problem list on file for this patient.       Current Outpatient Medications   Medication Sig Dispense Refill    sertraline 50 MG Oral Tab Take 1 tablet (50 mg total) by mouth daily. 90 tablet 3    Omeprazole 40 MG Oral Capsule Delayed Release Take 1 capsule (40 mg total) by mouth daily. 90 capsule 0    albuterol 108 (90 Base) MCG/ACT Inhalation Aero Soln Inhale 2 puffs into the lungs every 4 (four) hours as needed.      cetirizine 10 MG Oral Tab Take 1 tablet (10 mg total) by mouth daily.          Vitals:    01/14/25 1223   BP: 138/82   Pulse: 65   Temp: 97.6 °F (36.4 °C)   VITALSBody mass index is 33.18 kg/m².    Not anxious or depressed    neuro   and  cognition are nl   Cor reg   lungs clear         Alberto was seen today for follow - up.    Diagnoses and all orders for this visit:    Hyperglycemia  -     POC Glycohemoglobin [30327]    Anxiety    Gastroesophageal reflux disease, unspecified whether esophagitis present    Mild intermittent extrinsic asthma without complication (HCC)    Lipoma of torso    Other orders  -     sertraline 50 MG Oral Tab; Take 1 tablet (50 mg total) by mouth daily.    We discussed his anxiety.  I think it is the correct diagnosis will renew his sertraline at 50 mg.  Had a long discussion about intrinsic anxiety versus situational.  At this time I do not think he needs to see psychiatry or psychology.    We discussed his A1c stable at 5.8.  BMI is 33 so needs to lose some weight.  Just insulin resistance.    Asthma seems stable    GERD also stable continue present medications  discussed concerns about PPIs.    He has a lipoma on his right back.  Has had lump for years has not changed.  Let me know if it grows.                                        This note was prepared using Dragon Medical voice recognition dictation software and as a result, errors may occur. When identified, these errors have been corrected. While every attempt is made to correct errors during dictation, discrepancies may still exist

## 2025-03-03 RX ORDER — OMEPRAZOLE 40 MG/1
40 CAPSULE, DELAYED RELEASE ORAL DAILY
Qty: 90 CAPSULE | Refills: 0 | Status: SHIPPED | OUTPATIENT
Start: 2025-03-03

## 2025-05-19 ENCOUNTER — PATIENT MESSAGE (OUTPATIENT)
Age: 45
End: 2025-05-19

## 2025-05-22 NOTE — TELEPHONE ENCOUNTER
Responded to pt's T-Dap vaccine question via [facility] message.  No med record of any vaccines for this pt other than Hepatitis B and covid vaccines.

## 2025-06-14 ENCOUNTER — HOSPITAL ENCOUNTER (EMERGENCY)
Facility: HOSPITAL | Age: 45
Discharge: HOME | End: 2025-06-14
Attending: PEDIATRICS
Payer: COMMERCIAL

## 2025-06-14 VITALS
HEART RATE: 75 BPM | SYSTOLIC BLOOD PRESSURE: 137 MMHG | OXYGEN SATURATION: 97 % | RESPIRATION RATE: 16 BRPM | TEMPERATURE: 97 F | DIASTOLIC BLOOD PRESSURE: 94 MMHG | HEIGHT: 68 IN | BODY MASS INDEX: 30.01 KG/M2 | WEIGHT: 198 LBS

## 2025-06-14 DIAGNOSIS — S01.21XA NASAL LACERATION, INITIAL ENCOUNTER: Primary | ICD-10-CM

## 2025-06-14 PROCEDURE — 99283 EMERGENCY DEPT VISIT LOW MDM: CPT

## 2025-06-14 PROCEDURE — 12011 RPR F/E/E/N/L/M 2.5 CM/<: CPT

## 2025-06-15 NOTE — ED PROVIDER NOTES
Patient Seen in: [facility] Emergency Department        History  Chief Complaint   Patient presents with    Laceration/Abrasion     Stated Complaint: lac to right nostril with knife while cooking    Subjective:   HPI    45-year-old male who is here with right nose laceration.  He had a knife on the kitchen counter when he bent down, somehow the knife lacerated his nostril.      Objective:     History reviewed. No pertinent past medical history.           History reviewed. No pertinent surgical history.             Social History     Socioeconomic History    Marital status:    Tobacco Use    Smoking status: Former     Current packs/day: 0.00     Types: Cigarettes     Quit date:      Years since quittin.4    Smokeless tobacco: Never   Vaping Use    Vaping status: Never Used   Substance and Sexual Activity    Alcohol use: Yes     Comment: 7-10 weekly    Drug use: Never   Other Topics Concern    Reaction to local anesthetic No    Pt has a pacemaker No    Pt has a defibrillator No   Social History Narrative    ** Merged History Encounter **          Social Drivers of Health     Food Insecurity: Not on File (9/10/2022)    Received from [facility]    Food Insecurity     Food: 0   Transportation Needs: Not on File (9/10/2022)    Received from [facility]    Transportation Needs     Transportation: 0   Housing Stability: Not on File (9/10/2022)    Received from [facility]    Housing Stability     Housin                                Physical Exam    ED Triage Vitals [25]   /88   Pulse 77   Resp 16   Temp 97 °F (36.1 °C)   Temp src Temporal   SpO2 96 %   O2 Device None (Room air)       Current Vitals:   Vital Signs  BP: (!) 137/94  Pulse: 75  Resp: 16  Temp: 97 °F (36.1 °C)  Temp src: Temporal  MAP (mmHg): (!) 106    Oxygen Therapy  SpO2: 97 %  O2 Device: None (Room air)            Physical Exam  Constitutional:       General: He is not in acute distress.     Appearance: He is well-developed. He is  not diaphoretic.   HENT:      Head: Normocephalic and atraumatic.      Right Ear: External ear normal.      Left Ear: External ear normal.      Nose: Nose normal.      Comments: Right nostril with 1 cm superficial laceration.  No involvement of cartilage.     Mouth/Throat:      Mouth: Mucous membranes are moist.   Eyes:      Conjunctiva/sclera: Conjunctivae normal.      Pupils: Pupils are equal, round, and reactive to light.   Pulmonary:      Effort: Pulmonary effort is normal.   Abdominal:      General: Abdomen is flat.   Musculoskeletal:      Cervical back: Normal range of motion and neck supple.   Skin:     General: Skin is warm.      Coloration: Skin is not pale.   Neurological:      General: No focal deficit present.      Mental Status: He is alert and oriented to person, place, and time.   Psychiatric:         Mood and Affect: Mood normal.         Behavior: Behavior normal.               ED Course  Labs Reviewed - No data to display       Medications administered:  Medications - No data to display    Pulse oximetry:  Pulse oximetry on room air is 96% and is normal.     Cardiac monitoring:  Initial heart rate is 77 and is normal for age    Vital signs:  Vitals:    06/14/25 2106 06/14/25 2200   BP: 137/88 (!) 137/94   Pulse: 77 75   Resp: 16    Temp: 97 °F (36.1 °C)    TempSrc: Temporal    SpO2: 96% 97%   Weight: 89.8 kg    Height: 172.7 cm (5' 8\")      Chart review:  ^^ Review of prior external notes from unique sources (non-[facility] ED records):       PROCEDURES--    Tissue adhesive wound closure:    Prior to procedure, documentation was reviewed, informed consent was obtained, appropriate equipment was present, and a time out was performed to identify the correct patient, procedure and site.      Tthe risks, benefits, and alternatives were discussed, The wound was irrigated copiously with normal saline under pressure.  Patient was sterilely prepped and draped.  The wound was explored.  No sign of retained  foreign body.  No sign of vascular, tendinous or nervous interruption.  The wound was approximated with tissue adhesive. The wound approximated well.  The patient tolerated the procedure well.                    MDM     Assessment & Plan:    45 year old male with superficial right nostril laceration, easily closed with tissue adhesive.  Motrin or Tylenol as needed for pain.        ^^ Independent historian:   ^^ Prescription drug and OTC medication management considerations: as noted above      Patient or caregiver understands the course of events that occurred in the emergency department. Instructed to return to emergency department or contact PCP for persistent, recurrent, or worsening symptoms.    This report has been produced using speech recognition software and may contain errors related to that system including, but not limited to, errors in grammar, punctuation, and spelling, as well as words and phrases that possibly may have been recognized inappropriately.  If there are any questions or concerns, contact the dictating provider for clarification.     NOTE: The 21st Century C[employer] Act makes medical notes available to patients.  Be advised that this is a medical document written in medical language and may contain abbreviations or verbiage that is unfamiliar or direct.  It is primarily intended to carry relevant historical information, physical exam findings, and the clinical assessment of the physician.         Medical Decision Making  Problems Addressed:  Nasal laceration, initial encounter: self-limited or minor problem    Amount and/or Complexity of Data Reviewed  Independent Historian: spouse    Risk  OTC drugs.        Disposition and Plan     Clinical Impression:  1. Nasal laceration, initial encounter         Disposition:  Discharge  6/14/2025 10:05 pm    Follow-up:  [facility] Emergency Department  [address] [address]  [phone]  Follow up  As needed, if symptoms  worsen          Medications Prescribed:  Current Discharge Medication List                Supplementary Documentation:

## 2025-07-08 ENCOUNTER — TELEPHONE (OUTPATIENT)
Age: 45
End: 2025-07-08

## 2025-07-08 DIAGNOSIS — K21.9 GASTROESOPHAGEAL REFLUX DISEASE, UNSPECIFIED WHETHER ESOPHAGITIS PRESENT: Primary | ICD-10-CM

## 2025-07-11 NOTE — TELEPHONE ENCOUNTER
GI referral for Dr. Jean-Baptiste entered and sent to [facility] for authorization.     RN sent notification [employer] message to Alberto of insurance denial Omeprazole and referral for GI.

## (undated) NOTE — LETTER
24      Patient: Alberto Bonilla  : 1980 Visit date: 2024    Dear Angelito,      I examined your patient in consultation today.    He has ulnar hand and forearm numbness and tingling with weakness of the hand.    He may have an ulnar nerve compression at the elbow, but physical findings are soft.    He will obtain an elbow pad, and I placed him on a short course of anti-inflammatories.  If he has persistent symptoms, I would recommend he see physiatry for further evaluation and treatment, also considering a C8/T1 lesion.    Thank you for your kind referral. If I may answer any questions, please feel free to contact me.     Sincerely,   Munir Abdul MD     CC:   No Recipients